# Patient Record
Sex: MALE | Race: WHITE
[De-identification: names, ages, dates, MRNs, and addresses within clinical notes are randomized per-mention and may not be internally consistent; named-entity substitution may affect disease eponyms.]

---

## 2017-02-16 ENCOUNTER — HOSPITAL ENCOUNTER (EMERGENCY)
Dept: HOSPITAL 80 - FED | Age: 72
Discharge: HOME | End: 2017-02-16
Payer: COMMERCIAL

## 2017-02-16 VITALS
SYSTOLIC BLOOD PRESSURE: 115 MMHG | RESPIRATION RATE: 18 BRPM | HEART RATE: 79 BPM | OXYGEN SATURATION: 95 % | DIASTOLIC BLOOD PRESSURE: 82 MMHG

## 2017-02-16 VITALS — TEMPERATURE: 97.3 F

## 2017-02-16 DIAGNOSIS — R11.2: Primary | ICD-10-CM

## 2017-02-16 DIAGNOSIS — Z87.891: ICD-10-CM

## 2017-02-16 DIAGNOSIS — E11.9: ICD-10-CM

## 2017-02-16 LAB
% IMMATURE GRANULYOCYTES: 0.4 % (ref 0–1.1)
ABSOLUTE IMMATURE GRANULOCYTES: 0.03 10^3/UL (ref 0–0.1)
ABSOLUTE NRBC COUNT: 0 10^3/UL (ref 0–0.01)
ADD DIFF?: NO
ADD MORPH?: NO
ADD SCAN?: NO
ALBUMIN SERPL-MCNC: 4.5 G/DL (ref 3.5–5)
ALP SERPL-CCNC: 64 IU/L (ref 38–126)
ALT SERPL-CCNC: 35 IU/L (ref 21–72)
ANION GAP SERPL CALC-SCNC: 12 MEQ/L (ref 8–16)
AST SERPL-CCNC: 24 IU/L (ref 17–59)
ATYPICAL LYMPHOCYTE FLAG: 0 (ref 0–99)
BILIRUB SERPL-MCNC: 0.8 MG/DL (ref 0.1–1.4)
BILIRUBIN-CONJUGATED: 0.3 MG/DL (ref 0–0.5)
BILIRUBIN-UNCONJUGATED: 0.5 MG/DL (ref 0–1.1)
CALCIUM SERPL-MCNC: 10.2 MG/DL (ref 8.5–10.4)
CHLORIDE SERPL-SCNC: 105 MEQ/L (ref 97–110)
CO2 SERPL-SCNC: 25 MEQ/L (ref 22–31)
CREAT SERPL-MCNC: 0.9 MG/DL (ref 0.7–1.3)
ERYTHROCYTE [DISTWIDTH] IN BLOOD BY AUTOMATED COUNT: 12.6 % (ref 11.5–15.2)
FRAGMENT RBC FLAG: 10 (ref 0–99)
GFR SERPL CREATININE-BSD FRML MDRD: > 60 ML/MIN/{1.73_M2}
GLUCOSE SERPL-MCNC: 102 MG/DL (ref 70–100)
HCT VFR BLD CALC: 50.1 % (ref 40–51)
HGB BLD-MCNC: 17.4 G/DL (ref 13.7–17.5)
LEFT SHIFT FLG: 0 (ref 0–99)
LIPEMIA HEMOLYSIS FLAG: 90 (ref 0–99)
MCH RBC BLDCO QN: 31 PG (ref 27.9–34.1)
MCHC RBC AUTO-ENTMCNC: 34.7 G/DL (ref 32.4–36.7)
MCV RBC AUTO: 89.3 FL (ref 81.5–99.8)
NRBC-AUTO%: 0 % (ref 0–0.2)
PLATELET # BLD: 307 10^3/UL (ref 150–400)
PLATELET CLUMPS FLAG: 10 (ref 0–99)
PMV BLD AUTO: 9.5 FL (ref 8.7–11.7)
POTASSIUM SERPL-SCNC: 4.2 MEQ/L (ref 3.5–5.2)
PROT SERPL-MCNC: 7.8 G/DL (ref 6.3–8.2)
RBC # BLD AUTO: 5.61 10^6/UL (ref 4.4–6.38)
SODIUM SERPL-SCNC: 142 MEQ/L (ref 134–144)

## 2017-02-16 NOTE — EDPHY
H & P


Time Seen by Provider: 02/16/17 13:56


HPI/ROS: 





CHIEF COMPLAINT:  Nausea and vomiting for 1 week





HISTORY OF PRESENT ILLNESS:  71-year-old man has a history of depression 

anxiety on Depakote and also has ulcerative colitis.  Please add worse nausea 

with vomiting every day for the last week decreased oral intake.  Associated 

with some epigastric pain and noticed a lump just below his sternum today.  

Pain does not radiate.  Symptoms worse with oral intake.  Not associated with 

diarrhea although he is having bowel movements.  No fever or chills. No 

hematemesis or coffee-ground emesis.





REVIEW OF SYSTEMS:


Eye: no change in vision


ENT: no sore throat


Cardiac: no chest pain or syncope


Pulmonary: no cough or SOB


Abdomen:  HPI


Musculoskeletal: no back pain


Skin: no rash


Neuro: no headache


Constitutional: no fever


: no urinary symptoms





A comprehensive 10 point review of systems is otherwise negative aside from 

elements mentioned in the history of present illness.





PAST MEDICAL HISTORY:  History and physical dated 3/7/2011 personally reviewed 

by myself.  Includes DVT and ulcerative colitis.  GERD.  Non-insulin-dependent 

diabetes and depression.





Social history:  Former smoker, drinks wine every day.





General Appearance: Alert and conversant, cooperative.


Eyes: No scleral  icterus. 


ENT, Mouth:  Slightly dry mucous membranes


Respiratory: Normal respiratory effort, breath sounds equal, lungs are clear to 

auscultation.


Cardiovascular:  Regular rate and rhythm.


Gastrointestinal:  Abdomen is soft and he only has some mild epigastric 

tenderness but no rebound or guarding and normal bowel sounds.  Not distended.


Neurological: Alert and oriented x3.   Normally conversant. Face symmetric, 

normal movement and sensation in all extremities.


Skin: Warm and dry, no rashes.


Musculoskeletal: No peripheral edema and no joint swelling.


Psychiatric: Not agitated.





Emergency Department course/MDM:


Zofran 4 mg IV and normal saline 1 L.  Labs to include lipase LFTs and 

chemistry.  CT abdomen and pelvis.


Patient is on Remicade.





1510:  CT abdomen and pelvis viewed independently by myself for acute process.


1532:  Re-examined patient and discussed results.  No further vomiting. Stable 

for discharge and encouraged to call his psychiatrist tomorrow to refill his 

Depakote because he has run out


Constitutional: 


 Initial Vital Signs











Temperature (C)  36.3 C   02/16/17 13:57


 


Heart Rate  87   02/16/17 13:57


 


Respiratory Rate  16   02/16/17 13:57


 


Blood Pressure  142/75 H  02/16/17 13:57


 


O2 Sat (%)  94   02/16/17 13:57








 











O2 Delivery Mode               Room Air














Allergies/Adverse Reactions: 


 





No Known Drug Allergies Allergy (Unknown, Verified 03/08/11 01:03)


 Unknown








Home Medications: 














 Medication  Instructions  Recorded


 


Ondansetron Odt [Zofran Odt] 4 mg PO Q4PRN #6 tab 02/16/17














Medical Decision Making





- Diagnostics


Imaging: 





CT per Darinel has bilateral renal stones but no obstruction, prostate 

enlargement, bladder diverticulum, at 1529; no reason for epigastric pain or 

vomiting.


Differential Diagnosis: 





Differential diagnosis considered for nausea and vomiting including but not 

limited to gastroenteritis, gastritis, appendicitis, and medication side effect.





- Data Points


Laboratory Results: 


 Laboratory Results





 02/16/17 14:09 





 02/16/17 14:09 





 











  02/16/17 02/16/17 02/16/17





  14:09 14:09 14:08


 


WBC    7.41 10^3/uL 10^3/uL  





    (3.80-9.50)  


 


RBC    5.61 10^6/uL 10^6/uL  





    (4.40-6.38)  


 


Hgb    17.4 g/dL g/dL  





    (13.7-17.5)  


 


POC Hgb      17.7 gm/dL H gm/dL





     (14.5-17.3) 


 


Hct    50.1 % %  





    (40.0-51.0)  


 


POC Hct      52 % H %





     (42.8-50.6) 


 


MCV    89.3 fL fL  





    (81.5-99.8)  


 


MCH    31.0 pg pg  





    (27.9-34.1)  


 


MCHC    34.7 g/dL g/dL  





    (32.4-36.7)  


 


RDW    12.6 % %  





    (11.5-15.2)  


 


Plt Count    307 10^3/uL 10^3/uL  





    (150-400)  


 


MPV    9.5 fL fL  





    (8.7-11.7)  


 


Neut % (Auto)    47.7 % %  





    (39.3-74.2)  


 


Lymph % (Auto)    40.2 % %  





    (15.0-45.0)  


 


Mono % (Auto)    8.2 % %  





    (4.5-13.0)  


 


Eos % (Auto)    2.7 % %  





    (0.6-7.6)  


 


Baso % (Auto)    0.8 % %  





    (0.3-1.7)  


 


Nucleat RBC Rel Count    0.0 % %  





    (0.0-0.2)  


 


Absolute Neuts (auto)    3.53 10^3/uL 10^3/uL  





    (1.70-6.50)  


 


Absolute Lymphs (auto)    2.98 10^3/uL 10^3/uL  





    (1.00-3.00)  


 


Absolute Monos (auto)    0.61 10^3/uL 10^3/uL  





    (0.30-0.80)  


 


Absolute Eos (auto)    0.20 10^3/uL 10^3/uL  





    (0.03-0.40)  


 


Absolute Basos (auto)    0.06 10^3/uL 10^3/uL  





    (0.02-0.10)  


 


Absolute Nucleated RBC    0.00 10^3/uL 10^3/uL  





    (0-0.01)  


 


Immature Gran %    0.4 % %  





    (0.0-1.1)  


 


Immature Gran #    0.03 10^3/uL 10^3/uL  





    (0.00-0.10)  


 


POC Sodium      144 mEq/L mEq/L





     (134-144) 


 


Sodium  142 mEq/L mEq/L    





   (134-144)   


 


POC Potassium      3.7 mEq/L mEq/L





     (3.3-5.0) 


 


Potassium  4.2 mEq/L mEq/L    





   (3.5-5.2)   


 


POC Chloride      104 mEq/L mEq/L





     () 


 


Chloride  105 mEq/L mEq/L    





   ()   


 


Carbon Dioxide  25 mEq/l mEq/l    





   (22-31)   


 


Anion Gap  12 mEq/L mEq/L    





   (8-16)   


 


POC BUN      28 mg/dL H mg/dL





     (7-23) 


 


BUN  26 mg/dL H mg/dL    





   (7-23)   


 


Creatinine  0.9 mg/dL mg/dL    





   (0.7-1.3)   


 


POC Creatinine      0.9 mg/dL mg/dL





     (0.8-1.5) 


 


Estimated GFR  > 60     





    


 


Glucose  102 mg/dL H mg/dL    





   ()   


 


POC Glucose      107 mg/dL H mg/dL





     () 


 


Calcium  10.2 mg/dL mg/dL    





   (8.5-10.4)   


 


Total Bilirubin  0.8 mg/dL mg/dL    





   (0.1-1.4)   


 


Conjugated Bilirubin  0.3 mg/dL mg/dL    





   (0.0-0.5)   


 


Unconjugated Bilirubin  0.5 mg/dL mg/dL    





   (0.0-1.1)   


 


AST  24 IU/L IU/L    





   (17-59)   


 


ALT  35 IU/L IU/L    





   (21-72)   


 


Alkaline Phosphatase  64 IU/L IU/L    





   ()   


 


Total Protein  7.8 g/dL g/dL    





   (6.3-8.2)   


 


Albumin  4.5 g/dL g/dL    





   (3.5-5.0)   


 


Lipase  56.0 IU/L IU/L    





   ()   


 


Valproic Acid  < 10.0 mcg/mL L mcg/mL    





   (50.0-150.0)   











Medications Given: 


 








Discontinued Medications





Sodium Chloride (Ns)  1,000 mls @ 0 mls/hr IV ONCE ONE


   PRN Reason: Wide Open


   Stop: 02/16/17 14:11


   Last Admin: 02/16/17 15:10 Dose:  1,000 mls


Ondansetron HCl (Zofran)  4 mg IVP EDNOW ONE


   Stop: 02/16/17 14:11


   Last Admin: 02/16/17 15:10 Dose:  4 mg





Point of Care Test Results: 


 











  02/16/17





  14:08


 


POC Sodium  144


 


POC Potassium  3.7


 


POC Chloride  104


 


POC BUN  28 H


 


POC Creatinine  0.9


 


POC Glucose  107 H














Departure





- Departure


Clinical Impression: 


Nausea & vomiting


Qualifiers:


 Vomiting type: unspecified Vomiting Intractability: non-intractable Qualified 

Code(s): R11.2 - Nausea with vomiting, unspecified





Condition: Good


Instructions:  Acute Nausea and Vomiting (ED)


Referrals: 


Art Stanton MD [Primary Care Provider] - As per Instructions


Prescriptions: 


Ondansetron Odt [Zofran Odt] 4 mg PO Q4PRN #6 tab

## 2018-02-17 ENCOUNTER — HOSPITAL ENCOUNTER (INPATIENT)
Dept: HOSPITAL 80 - FED | Age: 73
LOS: 2 days | Discharge: HOME | DRG: 694 | End: 2018-02-19
Attending: INTERNAL MEDICINE | Admitting: INTERNAL MEDICINE
Payer: COMMERCIAL

## 2018-02-17 DIAGNOSIS — I10: ICD-10-CM

## 2018-02-17 DIAGNOSIS — F31.9: ICD-10-CM

## 2018-02-17 DIAGNOSIS — N32.3: ICD-10-CM

## 2018-02-17 DIAGNOSIS — E11.9: ICD-10-CM

## 2018-02-17 DIAGNOSIS — E78.00: ICD-10-CM

## 2018-02-17 DIAGNOSIS — N13.2: Primary | ICD-10-CM

## 2018-02-17 DIAGNOSIS — Z79.84: ICD-10-CM

## 2018-02-17 DIAGNOSIS — N40.1: ICD-10-CM

## 2018-02-17 DIAGNOSIS — Z86.718: ICD-10-CM

## 2018-02-17 DIAGNOSIS — Z87.891: ICD-10-CM

## 2018-02-17 DIAGNOSIS — R11.2: ICD-10-CM

## 2018-02-17 DIAGNOSIS — K59.00: ICD-10-CM

## 2018-02-17 DIAGNOSIS — N39.0: ICD-10-CM

## 2018-02-17 DIAGNOSIS — N17.9: ICD-10-CM

## 2018-02-17 LAB — PLATELET # BLD: 221 10^3/UL (ref 150–400)

## 2018-02-17 PROCEDURE — G0378 HOSPITAL OBSERVATION PER HR: HCPCS

## 2018-02-17 PROCEDURE — C2625 STENT, NON-COR, TEM W/DEL SY: HCPCS

## 2018-02-17 PROCEDURE — C1769 GUIDE WIRE: HCPCS

## 2018-02-17 PROCEDURE — C1758 CATHETER, URETERAL: HCPCS

## 2018-02-17 RX ADMIN — SODIUM CHLORIDE SCH MLS: 900 INJECTION, SOLUTION INTRAVENOUS at 22:48

## 2018-02-17 RX ADMIN — DOCUSATE SODIUM AND SENNOSIDES SCH TAB: 50; 8.6 TABLET ORAL at 22:47

## 2018-02-17 RX ADMIN — ZOLPIDEM TARTRATE PRN MG: 5 TABLET ORAL at 22:48

## 2018-02-17 NOTE — PDGENHP
History and Physical


History and Physical: 





Chief complaint:  Intractable nausea and vomiting and abdominal pain





History of present illness:  The patient is a 72-year-old male with a past 

medical history of diabetes, depression, DVT who developed intractable nausea, 

vomiting, and periumbilical abdominal pain a few hours after he ate a meal 2 

days ago.  Pain is characterized as sharp and does not radiate.  Severity is 

rated 7/10 at its worst.  Pain is better with sleeping.  It is worse after he 

eats.  Associated symptoms include fevers and chills.  Patient has also been 

constipated for the last 4-5 days.  In the ED, he was noted to have a kidney 

stone in left ureter and post obstructive bladder.





Past medical history:  Diabetes mellitus, bipolar depression, DVT





Past surgical history:  Dental implants





Medications:  Please see medication reconciliation form for medicine dosages.  

Patient takes Abilify, Wellbutrin, Lexapro, Depakote, Lipitor, metformin, 

Ambien.





Allergies:  NKA





Social history:  Patient is  and lives with his wife.  He is retired.  

He used to computer work.  He consumes about 1 glass of wine daily.  He does 

not smoke or do drugs.





Family history:  No known family medical history of kidney, bladder, prostate a 

bones.





Review of systems:  10 point review of systems was conducted and is negative 

except per HPI





Physical exam:


Vitals:  Reviewed


 General: The patient is an elderly male who is alert and in no acute distress. 


HEENT: normocephalic, extraocular movements intact, conjunctivae clear, no 

lesions on face. Nares and oral mucosa pink and moist. 


Neck: trachea midline, no visible masses, no external lesions. 


CV: +S1/S2, RRR, no MRG.


Resp: unlabored, CTAB no RRW.


Abd: soft and nondistended.  Bowel sounds are present.  Nontender to palpation 

throughout.


Musculoskeletal:  Normal gait.


Neuro: cranial nerves II  XII grossly intact. Intact gross motor and sensory 

function.


Psych: appropriate mood/affect. 


Skin:  no pallor.


Heme/lymph:  No peripheral edema.


:  No suprapubic or costovertebral tenderness.





Labs:  WBC 15.64, HGB 16.2, platelets 221, sodium 130 and potassium 4.2, 

chloride 100, BUN 28, creatinine 1.6, glucose 98, calcium 10.5, liver function 

tests within normal limits.





Other Data:  CT of the abdomen/pelvis - Left ureteral stone-5.6 x 4.4 x 7.6 mm 

obstructing left ureter.  Chronic bladder outlet obstruction secondary to 

enlarged prostate.  Stable larger right bladder diverticulum containing smaller 

stones.  Constipation.








Impression and plan:


Acute nephrolithiasis, obstructive


Intractable nausea and vomiting, secondary to above


Chronic bladder outlet obstruction 2/2 BPH


Bladder diverticulum


Acute kidney injury, likely post obstructive


Diabetes mellitus type 2


Bipolar depression


History of DVT


Insomnia


Constipation





-Discussed case with the ED physician-who consulted Urologist on call.


-Urology recommendations appreciated.


-IV fluids.


-Flomax.


-Check UA to rule out UTI, with urine culture if indicated.


-P.r.n. Antiemetics and analgesics.  Avoiding Zofran for QT prolonging 

interactions with Lexapro.


-Bowel regimen


-SSI/hypoglycemia protocol/poc glucose checks.


-Resume home meds once dosages are known- per Pharmacist Med Rec.


-Code status-full code


-VTE prophylaxis-Lovenox.


-Patient is being admitted for observation.

## 2018-02-17 NOTE — EDPHY
H & P


Stated Complaint: LARGE MEAL THURS/BEGAN VOMITING AFTER AND HASN'T STOPPED


Time Seen by Provider: 02/17/18 18:30





- Personal History


Current Tetanus/Diphtheria Vaccine: No





- Medical/Surgical History


Hx Asthma: No


Hx Chronic Respiratory Disease: No


Hx Diabetes: Yes


Hx Cardiac Disease: No


Hx Renal Disease: No


Hx Cirrhosis: No


Hx Alcoholism: No


Hx HIV/AIDS: No


Hx Splenectomy or Spleen Trauma: No


Other PMH: depression, DM, high chol, HTN





- Social History


Smoking Status: Former smoker


Constitutional: 


 Initial Vital Signs











Temperature (C)  36.6 C   02/17/18 18:26


 


Heart Rate  87   02/17/18 18:26


 


Respiratory Rate  16   02/17/18 18:26


 


Blood Pressure  152/101 H  02/17/18 18:26


 


O2 Sat (%)  98   02/17/18 18:26








 











O2 Delivery Mode               Room Air














Allergies/Adverse Reactions: 


 





No Known Drug Allergies Allergy (Unknown, Verified 02/17/18 18:23)


 Unknown








Home Medications: 














 Medication  Instructions  Recorded


 


Abilify  02/17/18


 


Ambien  02/17/18


 


Depakote  02/17/18


 


Lexapro  02/17/18


 


Lipitor  02/17/18


 


Metformin 1000 mg  02/17/18














Medical Decision Making





- Diagnostics


Imaging Results: 


 Imaging Impressions





Abdomen/Pelvis CT  02/17/18 18:49


Impression: 1. Left ureteral stone with obstruction described above


2. Chronic bladder outlet obstruction secondary to enlarged prostate gland.


3. Constipation. No evidence for bowel obstruction.


 


Final concordant results discussed with Dr. Foote at 7:25 PM.


 


General information for patients regarding this examination can be found at 

[email or web address].


 


If you have questions or comments about this report, please contact me at [phone](hospital) or [phone] (cell). 











Imaging: Discussed imaging studies w/ On call Radiologist, I viewed and 

interpreted images myself


ED Course/Re-evaluation: 





CHIEF COMPLAINT:  Vomiting, no bowel movement. 





HISTORY OF PRESENT ILLNESS:  The patient is a 73 y/o male arriving with his 

wife complaining of persistent vomiting since eating dinner on Thursday, 3 days 

ago. His medical history includes diabetes and hypertension. Thursday night he 

ate dry pork tenderloin "in big bites." He has vomited every time he tries to 

eat since then and is only keeping down small sips of water. He has associated 

pain when vomiting and general abdomen discomfort at rest. He has not had a 

bowel movement or flatulence since symptoms started. No fever, recent trauma, 

or recent illness. No history of abdominal surgeries or prior bowel 

obstructions.





REVIEW OF SYSTEMS:  





A 10 point review of systems was performed and is negative with the exception 

of the elements mentioned in the history of present illness.





PHYSICAL EXAM:  





HR, BP, O2 Sat, RR.  Temp noted


General Appearance:  Alert, well hydrated, appropriate, and non-toxic appearing.


Head:  Atraumatic without scalp tenderness or obvious injury


Eyes:  Pupils equal, round, reactive to light and accommodation, EOMI, no trauma

, no injection.


Nose:  Atraumatic, no rhinorrhea, clear.


Throat:  Mucus membranes dry.


Neck:  Supple, nontender, no lymphadenopathy.


Respiratory:  No retractions, no distress, no wheezes, and no accessory muscle 

use.  Lungs are clear to auscultation bilaterally.


Cardiovascular:  Regular rate and rhythm, no murmurs, rubs, or gallops. Good 

capillary refill all extremities.


Gastrointestinal:  Abdomen is soft, mild diffuse tenderness, mild distension, 

no masses, no rebound, no guarding, no peritoneal signs.


Musculoskeletal:  Normal active ROM of all extremities, atraumatic.


Neurological:  Alert, appropriate, and interactive.  The patient has non-focal 

cranial nerves, motor, sensory, and cerebellar exam.


Skin:  No rashes, good turgor, no nodules on palpation.





Past medical history: Depression, diabetes, hypercholesterolemia, hypertension


Past surgical history: No abdominal surgeries


Family history: Noncontributory


Social history: Wife at bedside. Retired. Lives in [place]. 





DIAGNOSTICS/PROCEDURES/CRITICAL CARE TIME:  





Abdominal CT: bladder outlet obstruction secondary to prostatic hyperplasia, 

obstructing left uretal stone, bladder diverticulum, constipation





DIFFERENTIAL DIAGNOSIS:   The differential diagnosis for the patient's nausea 

and vomiting included but was not limited to bowel obstruction, gastroenteritis

, appendicitis, cholecystitis, hernias, testicular torsion, gastritis, urinary 

tract infection, and medication side effect.





MEDICAL DECISION MAKING:  





This is a 73 y/o male who presents with a 3-day history of persistent vomiting 

and no bowel movement with associated abdominal discomfort. He has mild diffuse 

abdominal tenderness. Presentation concerning for bowel obstruction. Plan for IV

, ISTAT, and abdominal CT. 2L IV NS and 4mg IV Zofran administered. 





ISTAT shows creatinine of 1.6, CT will need to be without contrast. 





CT shows bladder outlet obstruction, obstructing left uretal stone, bladder 

diverticulum, constipation. 





Patient will require admission. Urology paged. Reassessed patient and discussed 

work up and recommendation for admission. He agrees to admission.





1942: Consulted with Dr. Currie, urology. She will consult during admission.





Spoke with hospitalist service. Dr. Adams accepts admission. 





- Data Points


Laboratory Results: 


 Laboratory Results





 02/17/18 18:35 





 02/17/18 18:35 





 











  02/17/18 02/17/18 02/17/18





  18:35 18:35 18:35


 


WBC      15.64 10^3/uL H 10^3/uL





     (3.80-9.50) 


 


RBC      5.19 10^6/uL 10^6/uL





     (4.40-6.38) 


 


Hgb      16.2 g/dL g/dL





     (13.7-17.5) 


 


POC Hgb  16.7 gm/dL gm/dL    





   (13.7-17.5)   


 


Hct      47.4 % %





     (40.0-51.0) 


 


POC Hct  49 % %    





   (40-51)   


 


MCV      91.3 fL fL





     (81.5-99.8) 


 


MCH      31.2 pg pg





     (27.9-34.1) 


 


MCHC      34.2 g/dL g/dL





     (32.4-36.7) 


 


RDW      11.9 % %





     (11.5-15.2) 


 


Plt Count      221 10^3/uL 10^3/uL





     (150-400) 


 


MPV      10.8 fL fL





     (8.7-11.7) 


 


Neut % (Auto)      76.8 % H %





     (39.3-74.2) 


 


Lymph % (Auto)      13.7 % L %





     (15.0-45.0) 


 


Mono % (Auto)      8.7 % %





     (4.5-13.0) 


 


Eos % (Auto)      0.1 % L %





     (0.6-7.6) 


 


Baso % (Auto)      0.3 % %





     (0.3-1.7) 


 


Nucleat RBC Rel Count      0.0 % %





     (0.0-0.2) 


 


Absolute Neuts (auto)      12.02 10^3/uL H 10^3/uL





     (1.70-6.50) 


 


Absolute Lymphs (auto)      2.14 10^3/uL 10^3/uL





     (1.00-3.00) 


 


Absolute Monos (auto)      1.36 10^3/uL H 10^3/uL





     (0.30-0.80) 


 


Absolute Eos (auto)      0.01 10^3/uL L 10^3/uL





     (0.03-0.40) 


 


Absolute Basos (auto)      0.05 10^3/uL 10^3/uL





     (0.02-0.10) 


 


Absolute Nucleated RBC      0.00 10^3/uL 10^3/uL





     (0-0.01) 


 


Immature Gran %      0.4 % %





     (0.0-1.1) 


 


Immature Gran #      0.06 10^3/uL 10^3/uL





     (0.00-0.10) 


 


POC Sodium  139 mEq/L mEq/L    





   (135-145)   


 


Sodium    139 mEq/L mEq/L  





    (135-145)  


 


POC Potassium  4.2 mEq/L mEq/L    





   (3.3-5.0)   


 


Potassium    4.7 mEq/L mEq/L  





    (3.5-5.2)  


 


POC Chloride  100 mEq/L mEq/L    





   ()   


 


Chloride    100 mEq/L mEq/L  





    ()  


 


Carbon Dioxide    23 mEq/l mEq/l  





    (22-31)  


 


Anion Gap    16 mEq/L mEq/L  





    (8-16)  


 


POC BUN  28 mg/dL H mg/dL    





   (7-23)   


 


BUN    27 mg/dL H mg/dL  





    (7-23)  


 


Creatinine    1.6 mg/dL H mg/dL  





    (0.7-1.3)  


 


POC Creatinine  1.6 mg/dL H mg/dL    





   (0.7-1.3)   


 


Estimated GFR    42   





    


 


Glucose    93 mg/dL mg/dL  





    ()  


 


POC Glucose  98 mg/dL mg/dL    





   ()   


 


Calcium    10.5 mg/dL H mg/dL  





    (8.5-10.4)  


 


Total Bilirubin    0.8 mg/dL mg/dL  





    (0.1-1.4)  


 


Conjugated Bilirubin    0.3 mg/dL mg/dL  





    (0.0-0.5)  


 


Unconjugated Bilirubin    0.5 mg/dL mg/dL  





    (0.0-1.1)  


 


AST    23 IU/L IU/L  





    (17-59)  


 


ALT    22 IU/L IU/L  





    (21-72)  


 


Alkaline Phosphatase    65 IU/L IU/L  





    ()  


 


Total Protein    7.7 g/dL g/dL  





    (6.3-8.2)  


 


Albumin    4.5 g/dL g/dL  





    (3.5-5.0)  


 


Lipase    21 IU/L L IU/L  





    ()  











Medications Given: 


 








Discontinued Medications





Sodium Chloride (Ns)  1,000 mls @ 0 mls/hr IV EDNOW ONE; Wide Open


   PRN Reason: Protocol


   Stop: 02/17/18 18:36


   Last Admin: 02/17/18 19:05 Dose:  1,000 mls


Sodium Chloride (Ns)  1,000 mls @ 0 mls/hr IV EDNOW ONE; Wide Open


   PRN Reason: Protocol


   Stop: 02/17/18 18:36


   Last Admin: 02/17/18 19:05 Dose:  1,000 mls


Ondansetron HCl (Zofran)  4 mg IVP EDNOW ONE


   Stop: 02/17/18 18:36


   Last Admin: 02/17/18 19:05 Dose:  4 mg





Point of Care Test Results: 


 











  02/17/18





  18:35


 


POC Sodium  139


 


POC Potassium  4.2


 


POC Chloride  100


 


POC BUN  28 H


 


POC Creatinine  1.6 H


 


POC Glucose  98














Departure





- Departure


Disposition: [facility] Inpatient Acute


Clinical Impression: 


 Urinary retention, Bladder outlet obstruction, Renal insufficiency, Dehydration

, Bladder diverticulum





Hydronephrosis


Qualifiers:


 Hydronephrosis type: with renal calculous obstruction Qualified Code(s): N13.2 

- Hydronephrosis with renal and ureteral calculous obstruction





Vomiting


Qualifiers:


 Vomiting type: unspecified Vomiting Intractability: intractable Nausea presence

: with nausea Qualified Code(s): R11.2 - Nausea with vomiting, unspecified





Condition: Fair


Referrals: 


Art Stanton MD [Primary Care Provider] - As per Instructions


Report Scribed for: Javy Foote


Report Scribed by: Marii Haddad


Date of Report: 02/17/18


Time of Report: 19:12

## 2018-02-18 LAB — PLATELET # BLD: 167 10^3/UL (ref 150–400)

## 2018-02-18 RX ADMIN — SODIUM CHLORIDE SCH MLS: 900 INJECTION, SOLUTION INTRAVENOUS at 14:19

## 2018-02-18 RX ADMIN — DOCUSATE SODIUM AND SENNOSIDES SCH TAB: 50; 8.6 TABLET ORAL at 22:29

## 2018-02-18 RX ADMIN — ENOXAPARIN SODIUM SCH: 100 INJECTION SUBCUTANEOUS at 08:13

## 2018-02-18 RX ADMIN — INSULIN LISPRO SCH: 100 INJECTION, SOLUTION INTRAVENOUS; SUBCUTANEOUS at 17:51

## 2018-02-18 RX ADMIN — TAMSULOSIN HYDROCHLORIDE SCH MG: 0.4 CAPSULE ORAL at 08:12

## 2018-02-18 RX ADMIN — INSULIN LISPRO SCH: 100 INJECTION, SOLUTION INTRAVENOUS; SUBCUTANEOUS at 08:11

## 2018-02-18 RX ADMIN — SODIUM CHLORIDE SCH MLS: 900 INJECTION, SOLUTION INTRAVENOUS at 20:00

## 2018-02-18 RX ADMIN — SODIUM CHLORIDE SCH MLS: 900 INJECTION, SOLUTION INTRAVENOUS at 05:54

## 2018-02-18 RX ADMIN — DOCUSATE SODIUM AND SENNOSIDES SCH TAB: 50; 8.6 TABLET ORAL at 08:12

## 2018-02-18 RX ADMIN — ACETAMINOPHEN PRN MG: 325 TABLET ORAL at 10:20

## 2018-02-18 RX ADMIN — ZOLPIDEM TARTRATE PRN MG: 5 TABLET ORAL at 22:30

## 2018-02-18 RX ADMIN — INSULIN LISPRO SCH: 100 INJECTION, SOLUTION INTRAVENOUS; SUBCUTANEOUS at 13:05

## 2018-02-18 NOTE — GOP
[f rep st]



                                                                OPERATIVE REPORT





DATE OF OPERATION:  02/18/2018



SURGEON:  Bridgette Currie MD



ASSISTANT:  None.



ANESTHESIA:  LMA.



PREOPERATIVE DIAGNOSIS:  Left obstructing ureteral stone, left renal stones, and distorted bladder du
e to large diverticulum and large prostate.



POSTOPERATIVE DIAGNOSIS:  Left obstructing ureteral stone, left renal stones, and distorted bladder d
ue to large diverticulum and large prostate.



PROCEDURE PERFORMED:  Cystoscopy, left retrograde pyelogram, left ureteral stent placement, 6-Sudanese 
by multivariable.



FINDINGS:  Distorted bladder anatomy due to a large tic and median lobe of prostate, left UO was very
 difficult to find due to this distorted anatomy.





ESTIMATED BLOOD LOSS:  Minimal.



INDICATIONS:  Symptomatic left obstructing stone and left renal stones.



DESCRIPTION OF PROCEDURE:  Patient was taken back to the operating room, placed on the operating tabl
e in the supine position.  General anesthesia induced without complication.  Time-out performed and C
ore measures satisfied including placement of Andrei Hugger, SCDs, and verification that 2 g of Ancef a
ntibiotic had been administered.  He was brought to the end of the table, placed in dorsal lithotomy 
position.  All pressure points padded.  Genitalia draped and prepped in the standard surgical fashion
 with Betadine.  A rigid cystoscope easily cannulated the urethral meatus and advanced atraumatically
 into the bladder.  The prostatic urethra was approximately 2 cm with a high-riding bladder neck and 
a large central zone tissue.  I did pan cystoscopy, and there were no lesions or tumors in the bladde
r, but a very large right lateral diverticulum.  The right ureteral orifice was easy to find.  The le
ft ureteral orifice was very difficult.  It was near the central zone ridge of his prostate and diffi
cult to see.  It took me about 20 minutes to find the left ureteral orifice, but eventually I was abl
e to find it, cannulated it with a wire, and a 5-Sudanese open-ended catheter performed a retrograde py
elogram.  The ureter also appeared very tortuous, but the calices were sharp.  There was some minimal
 dilation of the renal pelvis.  At this point then, I replaced my Glidewire and then placed a 6-Frenc
h by multivariable stent without difficulty.  There was a nice curl in the bladder and a curl in the 
kidney.  Due to his enlarged prostate and there being some irritation from rubbing the scope against 
the prostatic urethra, I elected to leave him with an 18-Sudanese coude Marie catheter; so this was chuck
veronica after lidocaine jelly was instilled per urethra.  I placed an 18-Sudanese Marie, inflated the ballo
on with 15 mL of sterile water, and then placed a belladonna opium suppository.  I did feel quite a b
it of stool, hard, rock-like, pebble-like in his rectum and did disimpact him as well.  I did a digit
al rectal exam, and his prostate was probably 30 g, smooth, symmetrical, no nodules.  The patient suhas
erated the procedure well, awoke from anesthesia without any difficulties, and was transferred to PAC
U in good condition.  We will let his ureter passively dilate as well as the left collecting system d
rain well over the next week and plan for revisiting the left ureteral stone and renal stones at a la
ter date.



COMPLICATIONS:  None.



DRAINS:  A Marie.



INDICATIONS FOR PROCEDURE:  The patient is a pleasant 72-year-old gentleman who has been having abdom
inal discomfort for several days and then left flank pain.  A CT scan without contrast demonstrated a
 left obstructing ureteral stone as well as nonobstructing renal stones on the left, also a large pro
state and a large bladder with a very large diverticulum.  He had perinephric stranding around this l
eft kidney as well in addition to some white blood cells in his urine.  I felt that, given his distor
jose anatomy as well as the possibility of a urinary tract infection, the best course of action would 
be to place a ureteral stent at this time and left his left collecting system decompress and any infe
ction drain.  The patient understood this rationale for just placing a left stent, and he agreed to p
shanelle.  I did go over the risks which included bleeding, infection, need for a stent, need for a Fol
ey, discomfort, pain, injury to the urethra, bladder, ureter, and surrounding tissues.  I also discus
sed that I may not be able to place a left stent given the anatomy as well as given the obstructing s
tone and then, at that point, he would need to have a percutaneous nephrostomy tube placed by [facility].  He understood and still agreed to proceed.





Job #:  [id number]

## 2018-02-18 NOTE — HOSPPROG
Hospitalist Progress Note


Assessment/Plan: 





I visited this patient in the postop recovery room where he was awake and 

talkative.  I also reviewed his case in detail today with Dr. Cardona.  She 

informed me that she did place a stent but there is still some stone and he 

will have to come back to the OR for complete removal.  In addition she feels 

that he will need a Marie catheter to be able to drain his bladder due to his 

BPH and prostate swelling at this time.  His procedure worse were uncomplicated 

did





DIAGNOSES: 


-obstructing ureteral stone with hydronephrosis; improvement in pain now with 

stent in place


-upper pole urinary tract infection proximal to his kidney stone


-BPH with significant prostatic swelling


-acute renal insufficiency likely due to obstruction


-diabetes mellitus


-bipolar depression


-history of DVT of leg








PLANS:


-continue current supportive care


-begin antibiotics with Levaquin


-Flomax


-when she revived back to his room with the floor will begin a trial of a 

regular diet and trial of ambulation to see if he is able to hydrate and feed 

himself and walk safely


-Marie catheter in place with leg bag and begin teaching the patient how to use 

leg bag; Marie to stay in place until he sees Dr. Junior nascimento in clinic next week


-Follow sugars very closely


-recheck renal function


-discharged home once he is taking p.o. Safely, ambulating safely, able to deal 

with the leg bag, and no other issues interfering with safe discharge; given 

his age his renal function and other issues suspect this will likely be 

tomorrow since it is 5:30 p.m. now





SUBJECTIVE:


Seen in postop area


He is feeling notably better with his stent in place, has Marie also which is 

not giving him any trouble


No nausea or other anesthetic side effects at this time, no shortness of breath 

chest pain or neurologic symptoms





OBJECTIVE


Vitals reviewed:  Mild systolic hypertension otherwise Normal without fever


Cardiac Monitor, my review:  Currently in sinus rhythm on monitor in PACU





Exam:


alert oriented 


skin warm dry color ok


resps not labored


lungs clear BSs


heart regular


abd soft nondistended nontender, bowel sounds present


limbs warm, no edema


Marie is in proper position draining yellow urine





iv site ok


White blood cell count down to 9000


Creatinine better at 1.5 but still elevated














Objective: 


 Vital Signs











Temp Pulse Resp BP Pulse Ox


 


 36.6 C   70   14   145/71 H  92 


 


 02/18/18 17:08  02/18/18 17:08  02/18/18 17:08  02/18/18 17:08  02/18/18 17:08








 Laboratory Results





 02/18/18 04:22 





 02/18/18 04:22 





 











 02/17/18 02/18/18 02/19/18





 06:59 06:59 06:59


 


Intake Total  3100 725


 


Output Total  250 760


 


Balance  2850 -35














- Time Spent With Patient


Time Spent with Patient: greater than 35 minutes


Time Spent with Patient: Greater than 35 minutes spent on this patients care, 

greater than 50% of time spent counseling, educating, and coordinating care 

regarding the above mentioned plan.





ICD10 Worksheet


Patient Problems: 


 Problems











Problem Status Onset


 


Bladder diverticulum Acute  


 


Bladder outlet obstruction Acute  


 


Dehydration Acute  


 


Hydronephrosis Acute  


 


Renal insufficiency Acute  


 


Urinary retention Acute  


 


Vomiting Acute

## 2018-02-18 NOTE — PDCONSULT
Consultant Note: 





RFC:  left obs ureteral stone, nonobs renal stones





History of present illness:  The patient is a 72-year-old male with GI sx the 

past few days, constipation, and CT in ER showed left obs ureteral stone and 

nonobs renal stones.


I personally reviewed this CT - left obs ureteral stone, nonobs renal stones, 

perinepheric stranding, large bladder diverticulum, large prostate.


He has had left flank pain for several days.


Also weak urinary stream.





Past medical history:  Diabetes mellitus, bipolar depression, DVT





Past surgical history:  Dental implants





Medications:  Please see medication reconciliation form for medicine dosages.  

Patient takes Abilify, Wellbutrin, Lexapro, Depakote, Lipitor, metformin, 

Ambien.





Allergies:  NKA





Social history:  Patient is  and lives with his wife.  He is retired.  

He used to computer work.  He consumes about 1 glass of wine daily.  He does 

not smoke or do drugs.





Family history:  No known family medical history of kidney, bladder, prostate a 

bones.





Review of systems:  10 point review of systems was conducted and is negative 

except per HPI





PE


AFVSS


Gen NAD A&O


CV regular


Lungs Normal effort


Abd soft


Ext Warm


CVA Left tenderness





WBC 15>9


Cr 1.5


UA - WBC 15-20, Neg nit, tr Leuks.





A/P


Left ureteral stone, nonobs stones, UA concerning for infection, as is his CT 

with perinepheric stranding. 


I feel best approach is to place a stent today and let his kidney decompress 

and if there is infection above the stone, that will drain as well.


Stent placement may be challenging given his distorted anatomy w large prostate 

and bladder diverticulum.


I explained that if stent placement is not possible, he would need PCNT and 

possibly nephroureteral stent, then we would go back to the OR at a later date 

to treat his stones, but this way, he will be draining and decompressed on the 

left side. 


I explained that if there is infection above the stone, treatment today 

requires use of high pressure fluids and infection could be pushed into the 

blood stream. 


He understands rationale for only placing stent today. 


We will then return in 1-2 weeks to treat all his left sided stones. 


I recommend abx postop for a few days.


Discussed risks including bleeding, infection, need for a stent, need for a 

cedeño, discomfort, injury to surrounding tissues such as bladder, urethra, 

ureter. 


He understood and agrees to proceed.

## 2018-02-18 NOTE — PDANEPAE
ANE History of Present Illness





renal stones





ANE Past Medical History





- Cardiovascular History


Hx Hypertension: Yes





- Pulmonary History


Hx Oxygen in Use at Home: No


Hx Sleep Apnea: No


Sleep Apnea Screening Result - Last Documented: Positive





- Endocrine History


Hx Diabetes: Yes





ANE Review of Systems


Review of Systems: 








ANE Patient History





- Allergies


Allergies/Adverse Reactions: 








No Known Drug Allergies Allergy (Unknown, Verified 02/17/18 18:23)


 Unknown








- Home Medications


Home Medications: 








ARIPiprazole [Abilify 2 mg (*)] 2 mg PO DAILY 02/17/18 [Last Taken 02/17/18]


Atorvastatin Calcium [Lipitor 20 mg (*)] 20 mg PO HS 02/17/18 [Last Taken 02/16/ 18]


Divalproex ER [Depakote  MG (*)] 750 mg PO HS 02/17/18 [Last Taken 02/16/ 18]


Escitalopram Oxalate [Lexapro] 5 mg PO DAILY 02/17/18 [Last Taken 02/17/18]


Fexofenadine HCl [Allegra Allergy] 180 mg PO DAILY 02/17/18 [Last Taken 02/17/18

]


Lisinopril [Zestril 5 mg (*)] 5 mg PO HS 02/17/18 [Last Taken 02/16/18]


Remicade Inj 100 mg (*) Q56D 02/17/18 [Last Taken 02/01/18]


Zolpidem Tartrate [Ambien 5MG (*)] 10 mg PO HS 02/17/18 [Last Taken 02/16/18]


buPROPion XL [Wellbutrin 150mg XL] 300 mg PO DAILY 02/17/18 [Last Taken 02/17/18

]


metFORMIN HCL [Metformin HCl ER] 500 mg PO BIDMEAL 02/17/18 [Last Taken 02/17/ 18 08:00]








- NPO status


NPO Since - Liquids (Date): 02/17/18


NPO Since - Liquids (Time): 23:55


NPO Since - Solids (Date): 02/17/18


NPO Since - Solids (Time): 23:55





- Smoking Hx


Smoking Status: Former smoker





ANE Labs/Vital Signs





- Labs


Result Diagrams: 


 02/18/18 04:22





 02/18/18 04:22





- Vital Signs


Blood Pressure: 157/81


Heart Rate: 60


Respiratory Rate: 16


O2 Sat (%): 91


Height: 180.34 cm


Weight: 70.3 kg





ANE Physical Exam





- Airway


Neck exam: FROM


Mallampati Score: Class 2


Mouth exam: normal dental/mouth exam





- Pulmonary


Pulmonary: no respiratory distress





- Cardiovascular


Cardiovascular: regular rate and rhythym





- ASA Status


ASA Status: II





ANE Anesthesia Plan


Anesthesia Plan: GA w LMA

## 2018-02-18 NOTE — PDMN
Medical Necessity


Medical necessity: C/M review:  Patient meets INPT criteria under MCG M-320 

Renal colic and kidney stones:   Acute obstructing left ureteral stone with 

hydronephrosis, upper pole urinary tract infection proximal to his kidney stone

, BPH with significant prostatic swelling, acute renal insufficiency likely due 

to obstruction requiring 2/18/2018 surgery - left ureteral stent placement, 

still some stone left (patient will need to go back to OR at a later time for 

complete removal), Marie catheter placement with leg bag,  planned 2/19/2018 

recheck renal function labs  ongoing IV fluids, resume diet, postop checks, 

patient teaching how to use leg bag, hold lisinopril and metformin due to acute 

renal failure, supportive care, comorbid age, diabetes, bipolar depression, 

history of DVT of leg.  MD anticipates > 2 MN LOS for ongoing med nec for eval 

and TX of above.

## 2018-02-18 NOTE — ASMTCMCOM
CM Note

 

CM Note                       

Notes:

Pt admitted for observation due to kidney stone.

 

Date Signed:  02/18/2018 02:37 PM

Electronically Signed By:Jena Herring LCSW

## 2018-02-18 NOTE — POSTOPPROG
Post Op Note


Date of Operation: 02/18/18


Surgeon: Bridgette Currie


Assistant: None


Anesthesia: LMA


Pre-op Diagnosis: left ureteral stone, left renal stones, distorted bladder due 

to dic


Post-op Diagnosis: same


Indication: left obs stone, left renal stones


Procedure: cystoscopy, left retrograde pyelogram, left stent 6F x multivar


Findings: distorted bladder anatomy due to large tic, Left UO difficult to find


Inf/Abcess present in the surg proc area at time of surgery?: No


Depth: Organ Space ()


EBL: Minimal


Complications: 





None, patient tolerated procedure well


Drains: Other (cedeño)

## 2018-02-19 VITALS
TEMPERATURE: 98.3 F | OXYGEN SATURATION: 95 % | HEART RATE: 58 BPM | DIASTOLIC BLOOD PRESSURE: 67 MMHG | SYSTOLIC BLOOD PRESSURE: 135 MMHG

## 2018-02-19 VITALS — RESPIRATION RATE: 16 BRPM

## 2018-02-19 LAB — PLATELET # BLD: 165 10^3/UL (ref 150–400)

## 2018-02-19 RX ADMIN — INSULIN LISPRO SCH: 100 INJECTION, SOLUTION INTRAVENOUS; SUBCUTANEOUS at 10:11

## 2018-02-19 RX ADMIN — ENOXAPARIN SODIUM SCH MG: 100 INJECTION SUBCUTANEOUS at 09:53

## 2018-02-19 RX ADMIN — METFORMIN HYDROCHLORIDE SCH MG: 500 TABLET, EXTENDED RELEASE ORAL at 16:55

## 2018-02-19 RX ADMIN — ACETAMINOPHEN PRN MG: 325 TABLET ORAL at 02:18

## 2018-02-19 RX ADMIN — TAMSULOSIN HYDROCHLORIDE SCH MG: 0.4 CAPSULE ORAL at 09:51

## 2018-02-19 RX ADMIN — INSULIN LISPRO SCH UNIT: 100 INJECTION, SOLUTION INTRAVENOUS; SUBCUTANEOUS at 12:03

## 2018-02-19 RX ADMIN — DOCUSATE SODIUM AND SENNOSIDES SCH TAB: 50; 8.6 TABLET ORAL at 09:52

## 2018-02-19 RX ADMIN — METFORMIN HYDROCHLORIDE SCH MG: 500 TABLET, EXTENDED RELEASE ORAL at 10:40

## 2018-02-19 NOTE — ASMTLACE
LACE

 

Length of stay for            Answers:  1 day                                 

current admission                                                             

Acuity / Level of             Answers:  Yes                                   

Care: Did the patient                                                         

have an inpatient                                                             

admission?                                                                    

Comorbidities - select        Answers:  Diabetes (uncontrolled or             

all that apply                          controlled)                           

# of Emergency department     Answers:  1-2                                   

visits in the last 6                                                          

months                                                                        

Social determinants           Answers:  Mental health diagnosis               

                                        (anxiety, depression, pers            

                                        onality disorders, etc.)              

Score: 9

 

Date Signed:  02/19/2018 04:16 PM

Electronically Signed By:KIRK Rosa

## 2018-02-19 NOTE — PDDCSUM
Discharge Summary


Discharge Summary: 





DISCHARGE DIAGNOSES:


-acute obstructing ureteral kidney stone with hydronephrosis and suspected 

urinary tract infection proximal to the obstruction


-postoperative hematuria


-benign prostatic hypertrophy, acute urinary retention requiring Marie catheter


-acute renal insufficiency likely caused by ureteral obstruction, relieved by 

placement of ureteral stent with good resolution


-history of DVT, no evidence of thromboembolic disease here


-diabetes mellitus with reasonably good blood sugar control here








CONSULTANTS:


Dr. Currie





PROCEDURES:


Cysto ureteroscopy with laser treatment of kidney stones, extraction of kidney 

stones, placement of ureteral stent





HOSPITAL COURSE SUMMARY:


This patient came into the hospital complaining of abnormal flank pain 

radiating to the testis and was found to have an obstructing ureteral stone 

that was 7 cm largest diameter with hydronephrosis.  He was treated with 

hydration and pain medications.  Was clear he was not a passed the stone.  He 

went to the operating room or had cysto ureteroscopy with stone extraction 

after laser treatment and placement of a stent.  This was all well tolerated 

without complication.  However was noticed does have significant BPH and a lot 

of prostatic edema and was felt that he was not going to be emptying his 

bladder well.  Therefore a Marie catheter is electively left in place at this 

time to be removed in the clinic on follow-up.  The patient is trained in the 

use of the Marie catheter and leg bag by our nursing staff.  There was some 

purulent material from above the stone so the patient started on some Levaquin 

at this time as well as some Flomax.





At this point the patient is ambulating well without difficulty, eating well 

and drinking fluids, has stable vital signs, no fever, understands his 

instructions for follow-up well.


He is stable for discharge to home.





PENDING TEST RESULTS:


Kidney stone analysis





MEDICATION CHANGES:


Addition of Levaquin 750 mg for 5 days


Addition of Flomax 0.4 mg daily





FOLLOW-UP PLAN:


Follow-up with Dr. Currie in 1 week at which time he will have his Marie 

catheter removed, plans for further surgical removal of stent and stones





Greater than 35 minutes bedside and care coordination time today

## 2018-02-19 NOTE — ASDISCHSUM
----------------------------------------------

Discharge Information

----------------------------------------------

Plan Status:Home with No Needs                       Medically Cleared to Leave:02/18/2018

Discharge Date:02/18/2018                            CM D/C Disposition:Home, Routine, Self-Care

ADT D/C Disposition:Home, Routine, Self-Care         Projected Discharge Date:02/18/2018

Transportation at D/C:Family                         Discharge Delay Reason:

Follow-Up Date:02/18/2018                            Discharge Slot:

Final Diagnosis:

----------------------------------------------

Placement Information

----------------------------------------------

----------------------------------------------

Patient Contact Information

----------------------------------------------

Contact Name:DWAYNE                        Relationship:Wife

Address:[address] [place] [address]                                Home [phone]

                                                     Work [phone]

City:[facility] Phone:

[facility]/Zip Code:[place] [address]                              Email:

----------------------------------------------

Financial Information

----------------------------------------------

Financial Class:HMO and PPO Plans

Primary Plan Desc:[facility]                  Primary Plan Number:[id number]

Secondary Plan Desc:MEDICARE INPATIENT               Secondary Plan Number:[id number]

 

 

----------------------------------------------

Assessment Information

----------------------------------------------

----------------------------------------------

[facility] CM Progress Note

----------------------------------------------

CM Note

 

CM Note                       

Notes:

Pt admitted for observation due to kidney stone.

 

Date Signed:  02/18/2018 02:37 PM

Electronically Signed By:Jena Herring LCSW

 

 

----------------------------------------------

LACE

----------------------------------------------

LACE

 

Length of stay for            Answers:  1 day                                 

current admission                                                             

Acuity / Level of             Answers:  Yes                                   

Care: Did the patient                                                         

have an inpatient                                                             

admission?                                                                    

Comorbidities - select        Answers:  Diabetes (uncontrolled or             

all that apply                          controlled)                           

# of Emergency department     Answers:  1-2                                   

visits in the last 6                                                          

months                                                                        

Social determinants           Answers:  Mental health diagnosis               

                                        (anxiety, depression, pers            

                                        onality disorders, etc.)              

Score: 9

 

Date Signed:  02/19/2018 04:16 PM

Electronically Signed By:KIRK Rosa

 

 

----------------------------------------------

Case Management Discharge Plan Note

----------------------------------------------

Case Management Discharge

 

Discharge Order Complete?     Answers:  Yes                                   

Patient to Obtain             Answers:  via Family                            

Medications                                                                   

Transportation Arranged       Answers:  Family/Friends                        

Discharge Comments            

Notes:

Pt is discharging home today with no CM needs. S/p stent placement. Will follow up with urologist. 

 

Date Signed:  02/19/2018 04:18 PM

Electronically Signed By:KIRK Rosa

 

 

----------------------------------------------

Intervention Information

----------------------------------------------

## 2018-03-22 ENCOUNTER — HOSPITAL ENCOUNTER (OUTPATIENT)
Dept: HOSPITAL 80 - FIMAGING | Age: 73
End: 2018-03-22
Attending: UROLOGY
Payer: COMMERCIAL

## 2018-03-22 DIAGNOSIS — N28.1: ICD-10-CM

## 2018-03-22 DIAGNOSIS — N32.3: ICD-10-CM

## 2018-03-22 DIAGNOSIS — N20.0: Primary | ICD-10-CM

## 2018-06-25 ENCOUNTER — HOSPITAL ENCOUNTER (OUTPATIENT)
Dept: HOSPITAL 80 - BMCIMAGING | Age: 73
End: 2018-06-25
Attending: UROLOGY
Payer: COMMERCIAL

## 2018-06-25 DIAGNOSIS — N13.30: ICD-10-CM

## 2018-06-25 DIAGNOSIS — N32.3: ICD-10-CM

## 2018-06-25 DIAGNOSIS — N20.0: Primary | ICD-10-CM

## 2018-07-09 ENCOUNTER — HOSPITAL ENCOUNTER (OUTPATIENT)
Dept: HOSPITAL 80 - FIMAGING | Age: 73
End: 2018-07-09
Attending: UROLOGY
Payer: COMMERCIAL

## 2018-07-09 DIAGNOSIS — N32.3: Primary | ICD-10-CM

## 2018-07-09 DIAGNOSIS — N20.0: ICD-10-CM

## 2018-07-09 DIAGNOSIS — N40.0: ICD-10-CM

## 2018-07-15 ENCOUNTER — HOSPITAL ENCOUNTER (OUTPATIENT)
Dept: HOSPITAL 80 - FIMAGING | Age: 73
End: 2018-07-15
Attending: UROLOGY
Payer: COMMERCIAL

## 2018-07-15 DIAGNOSIS — N32.3: ICD-10-CM

## 2018-07-15 DIAGNOSIS — N40.0: Primary | ICD-10-CM

## 2018-07-15 PROCEDURE — A9585 GADOBUTROL INJECTION: HCPCS
